# Patient Record
Sex: FEMALE | Race: BLACK OR AFRICAN AMERICAN | NOT HISPANIC OR LATINO
[De-identification: names, ages, dates, MRNs, and addresses within clinical notes are randomized per-mention and may not be internally consistent; named-entity substitution may affect disease eponyms.]

---

## 2023-12-27 ENCOUNTER — NON-APPOINTMENT (OUTPATIENT)
Age: 23
End: 2023-12-27

## 2023-12-27 PROBLEM — Z00.00 ENCOUNTER FOR PREVENTIVE HEALTH EXAMINATION: Status: ACTIVE | Noted: 2023-12-27

## 2024-01-14 NOTE — LETTER BODY
[Dear  ___] : Dear ~MAI, [I had the pleasure of evaluating your patient, [unfilled] for ___] : I had the pleasure of evaluating your patient, [unfilled] for [unfilled]. [Attached please find my note.] : Attached please find my note.

## 2024-01-16 ENCOUNTER — NON-APPOINTMENT (OUTPATIENT)
Age: 24
End: 2024-01-16

## 2024-01-16 ENCOUNTER — APPOINTMENT (OUTPATIENT)
Dept: GYNECOLOGIC ONCOLOGY | Facility: CLINIC | Age: 24
End: 2024-01-16
Payer: COMMERCIAL

## 2024-01-16 ENCOUNTER — TRANSCRIPTION ENCOUNTER (OUTPATIENT)
Age: 24
End: 2024-01-16

## 2024-01-16 VITALS
WEIGHT: 175 LBS | BODY MASS INDEX: 32.2 KG/M2 | HEIGHT: 62 IN | HEART RATE: 88 BPM | SYSTOLIC BLOOD PRESSURE: 116 MMHG | DIASTOLIC BLOOD PRESSURE: 75 MMHG

## 2024-01-16 DIAGNOSIS — N93.9 ABNORMAL UTERINE AND VAGINAL BLEEDING, UNSPECIFIED: ICD-10-CM

## 2024-01-16 DIAGNOSIS — Z78.9 OTHER SPECIFIED HEALTH STATUS: ICD-10-CM

## 2024-01-16 PROCEDURE — 99205 OFFICE O/P NEW HI 60 MIN: CPT | Mod: 25

## 2024-01-16 PROCEDURE — 57454 BX/CURETT OF CERVIX W/SCOPE: CPT

## 2024-01-16 RX ORDER — NORETHINDRONE ACETATE AND ETHINYL ESTRADIOL, ETHINYL ESTRADIOL AND FERROUS FUMARATE 1MG-10(24)
KIT ORAL
Refills: 0 | Status: ACTIVE | COMMUNITY

## 2024-01-16 NOTE — DISCUSSION/SUMMARY
[Reviewed Clinical Lab Test(s)] : Results of clinical tests were reviewed. [Discuss Tests w/Referring Providers] : Results of labs/radiology studies and the treatment recommendations were discussed with performing/referring physician. [Discuss Alternatives/Risks/Benefits w/Patient] : All alternatives, risks, and benefits were discussed with the patient/family and all questions were answered.  Patient expressed good understanding and appreciates the importance of follow up as recommended. [Visit Time ___ Minutes] : [unfilled] minutes [Face to Face Time___ Minutes] : with [unfilled] minutes in face to face consultation. [FreeTextEntry1] : - Patients history and work up to date reviewed in detail. - Plan to have her pathology re-reviewed her at [facility] to assure the diagnosis.  - Discussed HPV 16 or 18 positivity is the highest risk clinical scenario and is an indication for immediate referral to colposcopy which was already performed by her referring provider revealing CIN2. Patient counseled that most infections detected over the years of screening are reactivations of latent infections that are acquired at or near sexual debut. Reactivation of a latent infection could imply waning immunity, and the patient might be at increased risk of persistence. Discussed this is particularly common in immunocompromised individuals albeit she doesn't currently have any risk factors for immunocompromise. -Also discussed persistent HPV infection (defined as consecutively positive HPV results at least 12 months apart) is a necessary pathogenetic step for progression to clinically relevant disease. Most, if not all, patients with persistent HPV infection will be diagnosed with cervical intraepithelial neoplasia 2 or more (JESSIKA 2+) within five to seven years, many in as few as two years. I also informed her that given her age I suspect that this HPV infection is likely due to re-infection as opposed to persistent infection. -Counseling provided that High-grade JESSIKA is also more likely to regress in younger patients than in older patients and is less likely to progress to cancer. In a meta-analysis, patients younger than 30 years with JESSIKA 2 had a regression rate of 60 percent at 24 months, which was slightly higher than the 50 percent regression rate in the overall study population. In one study, patients ages 20 to 24 years with JESSIKA 3 were estimated to have a 0.5 percent progression rate to cancer in one year, which is substantially lower than the 10 percent per year risk for patients =80 years old. -Discussed that management of JESSIKA 2,3 in nonpregnant patients <25 years is as follows: JESSIKA 2 is specified then observation is preferred but treatment is recommended. -Discussed the importance of Gardasil vaccination for both her and her partner who was present for the visit and they expressed understanding. -Discussion regarding regular consistent use of BCM had today. GCCT sent to assure no infectious etiology of her post coital bleeding episode.  -When observation is performed then should initially consist of cytology and colposcopy at 6 and 12 months. -After this extensive counseling shared decision made to proceed with observation with cytology and colposcopy at 6 months with management plan determined thereafter based on these results. -Discussed rationale for baseline colposcopy today to which the patient was amenable. Colposcopy done today given this is my baseline examination and will discuss results via vteb visit in 2 weeks. Patient tolerated the procedure well and standard post procedure precautions provided. - I spent the time noted on the day of this patient encounter preparing for, providing and documenting the above service. I have counseled and educated the patient on the differential, workup, disease course, and treatment/management plan. Education was provided to the patient during this encounter. All questions and concerns were answered and addressed in detail.

## 2024-01-16 NOTE — PROCEDURE
[Cervical Biopsy] : a cervical biopsy [Patient] : the patient [Written consent] : written consent was obtained prior to the procedure and is detailed in the patient's record [Betadine] : betadine [Silver Nitrate] : silver nitrate [Yes] : the specimen was sent to pathology [No Complications] : none [FreeTextEntry1] : All the risks, benefits and alternatives to the procedure were extensively discussed with the patient and written consent was obtained. A timeout protocol was performed prior to initiating the procedure. Bimanual examination was done to determine the position of the uterus. Vaginal speculum was inserted, and the cervix was visualized and adequate colposcopy due to ability to visualize entire transformation zone. The cervix was cleansed with 5% acetic acid and allowed to bathe in acetic acid for at least 1 minute. A mild acetowhite lesion was noted at both the 12 o'clock position with no punctuations or vascularity. Biopsy from site was taken with the Kevorkian biopsy forceps. Next endocervical curettage was gently performed where cervical stenosis noted and gentle dilation with the curettage was performed. Silver nitrate was applied to the biopsy sites and excellent hemostasis noted.   Follow up: The patient tolerated the procedure well without complications.  Standard post-procedure care is explained and return precautions are given.   Specimen sent to pathology for analysis.

## 2024-01-16 NOTE — PHYSICAL EXAM
[Chaperone Present] : A chaperone was present in the examining room during all aspects of the physical examination [Normal] : Recto-Vaginal Exam: Normal [Fully active, able to carry on all pre-disease performance without restriction] : Status 0 - Fully active, able to carry on all pre-disease performance without restriction [FreeTextEntry1] : Eielen LAMAR [de-identified] : See colpo

## 2024-01-16 NOTE — HISTORY OF PRESENT ILLNESS
[FreeTextEntry1] : GYN/Ref: Laurie Vance NP PCP: None  Ms. Marquez, 23 years old, referred for JESSIKA-2 on recent colposcopy.  Patient states her most recent Pap smear came back as low-grade dysplasia, followed by a colposcopy identifying high-grade JESSIKA-2.  She was referred for further management.  Patient had her first Pap smear last year which was normal.  Denies f/c/n/v/d/abnormal vaginal bleeding.  Denies changes to bowel or bladder habits; denies unexplained weight loss or gain.  Denies abdominal pain, pressure, back pain or bloating.  Denies any other associated symptoms.   She has had one episode of bleeding with sex about 2 weeks ago.  She thinks this may have been due to a mistake in forgetting to take her BCM pills.   Final pathology: 12/7/2023 colposcopy ([facility]) 1.  ECC: Benign squamous epithelium; benign endocervical tissue. 2.  Cervix 12:00 biopsy: JESSIKA-2 in the background of JESSIKA-1.  Pap: 10/24/2023: LSIL  POB: G0.  Pt does wish to have children in the future PGYN:  Menarche age 14;  LMP 11/16/23 PMH: No significant past medical history Meds: Low Loestrin Allergies: NKDA PSH: No past surgical history Social Hx: Non-cigarette smoker, occasional alcohol, prior marijuana use. FH: No family history of cancer  Health Maintenance: Mammogram: Never.  Patient aware breast cancer screening mammogram begins at age 40. Colonoscopy: Never.  Patient aware colon cancer screening begins at age 45. PAP: As above

## 2024-01-17 LAB
C TRACH RRNA SPEC QL NAA+PROBE: NOT DETECTED
N GONORRHOEA RRNA SPEC QL NAA+PROBE: NOT DETECTED
SOURCE AMPLIFICATION: NORMAL

## 2024-01-24 LAB — CORE LAB BIOPSY: NORMAL

## 2024-02-02 ENCOUNTER — APPOINTMENT (OUTPATIENT)
Dept: GYNECOLOGIC ONCOLOGY | Facility: CLINIC | Age: 24
End: 2024-02-02
Payer: COMMERCIAL

## 2024-02-02 PROCEDURE — 99212 OFFICE O/P EST SF 10 MIN: CPT

## 2024-02-02 NOTE — HISTORY OF PRESENT ILLNESS
[FreeTextEntry1] : **Please note that this visit was converted to telemed due to technical difficulties** Permission was granted from the patient for this visit.   GYN/Ref: Laurie Vance NP PCP: None  Ms. Marquez, 23 years old, referred for JESSIKA-2 on recent colposcopy.  Patient states her most recent Pap smear came back as low-grade dysplasia, followed by a colposcopy identifying high-grade JESSIKA-2.  She was referred for further management.  Patient had her first Pap smear last year which was normal.  Denies f/c/n/v/d/abnormal vaginal bleeding.  Denies changes to bowel or bladder habits; denies unexplained weight loss or gain.  Denies abdominal pain, pressure, back pain or bloating.  Denies any other associated symptoms.   At her last visit she noted that she has had one episode of bleeding with sex about 2 weeks ago.  She thinks this may have been due to a mistake in forgetting to take her BCM pills.   She is now concerned today because her LMP was 11/14/23 and she has not had a menses since.  She has had multiple negative pregnancy tests. She also notes a scant amount of brownish discharge after a recent intercourse but no bleeding. We reviewed her recent colposcopy results and biopsy results which were benign. Reviewed recent negative GCCT. We discussed plan for close interval follow up in 6 months for plan of observation with cytology and colposcopy at 6 months. We discussed plan for TVUS and possible EMB in the next month for workup of her AUB.  Final pathology: 1/16/2024:  Colposcopy ([facility]) 1.  Cervix, 12:00, biopsy:      -   Benign squamocolumnar junction mucosa with acute inflammation and reactive changes 2.  Endocervix, curettings:      -   Benign endocervical epithelium  12/7/2023 colposcopy ([facility]) 1.  ECC: Benign squamous epithelium; benign endocervical tissue. 2.  Cervix 12:00 biopsy: JESSIKA-2 in the background of JESSIKA-1.  Pap: 10/24/2023: LSIL  POB: G0.  Pt does wish to have children in the future PGYN:  Menarche age 14;  LMP 11/16/23 PMH: No significant past medical history Meds: Low Loestrin Allergies: NKDA PSH: No past surgical history Social Hx: Non-cigarette smoker, occasional alcohol, prior marijuana use. FH: No family history of cancer  Health Maintenance: Mammogram: Never.  Patient aware breast cancer screening mammogram begins at age 40. Colonoscopy: Never.  Patient aware colon cancer screening begins at age 45. PAP: As above

## 2024-02-02 NOTE — DISCUSSION/SUMMARY
[Reviewed Clinical Lab Test(s)] : Results of clinical tests were reviewed. [Discuss Tests w/Referring Providers] : Results of labs/radiology studies and the treatment recommendations were discussed with performing/referring physician. [Discuss Alternatives/Risks/Benefits w/Patient] : All alternatives, risks, and benefits were discussed with the patient/family and all questions were answered.  Patient expressed good understanding and appreciates the importance of follow up as recommended. [Visit Time ___ Minutes] : [unfilled] minutes [Face to Face Time___ Minutes] : with [unfilled] minutes in face to face consultation. [FreeTextEntry1] : - Patients history and work up to date reviewed in detail. - Plan to have her pathology re-reviewed her at [facility] to assure the diagnosis, this is still pending.  - Discussed HPV 16 or 18 positivity is the highest risk clinical scenario and is an indication for immediate referral to colposcopy which was already performed by her referring provider revealing CIN2. Patient counseled that most infections detected over the years of screening are reactivations of latent infections that are acquired at or near sexual debut. Reactivation of a latent infection could imply waning immunity, and the patient might be at increased risk of persistence. Discussed this is particularly common in immunocompromised individuals albeit she doesn't currently have any risk factors for immunocompromise. -Also discussed persistent HPV infection (defined as consecutively positive HPV results at least 12 months apart) is a necessary pathogenetic step for progression to clinically relevant disease. Most, if not all, patients with persistent HPV infection will be diagnosed with cervical intraepithelial neoplasia 2 or more (JESSIKA 2+) within five to seven years, many in as few as two years. I also informed her that given her age I suspect that this HPV infection is likely due to re-infection as opposed to persistent infection. -Counseling provided that High-grade JESSIKA is also more likely to regress in younger patients than in older patients and is less likely to progress to cancer. In a meta-analysis, patients younger than 30 years with JESSIKA 2 had a regression rate of 60 percent at 24 months, which was slightly higher than the 50 percent regression rate in the overall study population. In one study, patients ages 20 to 24 years with JESSIKA 3 were estimated to have a 0.5 percent progression rate to cancer in one year, which is substantially lower than the 10 percent per year risk for patients =80 years old. -Discussed that management of JESSIKA 2,3 in nonpregnant patients <25 years is as follows: JESSIKA 2 is specified then observation is preferred but treatment is recommended. -Discussed the importance of Gardasil vaccination again. -She is now concerned today because her LMP was 11/14/23 and she has not had a menses since.  She has had multiple negative pregnancy tests. She also notes a scant amount of brownish discharge after a recent intercourse but no bleeding. We reviewed her recent colposcopy results and biopsy results which were benign. Reviewed recent negative GCCT. We discussed plan for close interval follow up in 6 months for plan of observation with cytology and colposcopy at 6 months. We discussed plan for TVUS and possible EMB in the next month for workup of her AUB. -When observation is performed then should initially consist of cytology and colposcopy at 6 and 12 months. -After this extensive counseling shared decision made to proceed with observation with cytology and colposcopy at 6 months with management plan determined thereafter based on these results. -For close interval follow up to review her TVUS and discuss if EMB indicated. She will call to schedule follow up appt once sono scheduled. - I spent the time noted on the day of this patient encounter preparing for, providing and documenting the above service. I have counseled and educated the patient on the differential, workup, disease course, and treatment/management plan. Education was provided to the patient during this encounter. All questions and concerns were answered and addressed in detail.

## 2024-02-28 ENCOUNTER — APPOINTMENT (OUTPATIENT)
Dept: ULTRASOUND IMAGING | Facility: CLINIC | Age: 24
End: 2024-02-28
Payer: COMMERCIAL

## 2024-02-28 ENCOUNTER — OUTPATIENT (OUTPATIENT)
Dept: OUTPATIENT SERVICES | Facility: HOSPITAL | Age: 24
LOS: 1 days | End: 2024-02-28
Payer: COMMERCIAL

## 2024-02-28 DIAGNOSIS — Z00.8 ENCOUNTER FOR OTHER GENERAL EXAMINATION: ICD-10-CM

## 2024-02-28 DIAGNOSIS — N93.9 ABNORMAL UTERINE AND VAGINAL BLEEDING, UNSPECIFIED: ICD-10-CM

## 2024-02-28 PROCEDURE — 76856 US EXAM PELVIC COMPLETE: CPT

## 2024-02-28 PROCEDURE — 76856 US EXAM PELVIC COMPLETE: CPT | Mod: 26

## 2024-02-28 PROCEDURE — 76830 TRANSVAGINAL US NON-OB: CPT

## 2024-02-28 PROCEDURE — 76830 TRANSVAGINAL US NON-OB: CPT | Mod: 26

## 2024-03-18 PROBLEM — N93.9 ABNORMAL UTERINE BLEEDING: Status: ACTIVE | Noted: 2024-02-02

## 2024-03-18 PROBLEM — N87.1 CIN II (CERVICAL INTRAEPITHELIAL NEOPLASIA II): Status: ACTIVE | Noted: 2024-01-14

## 2024-03-18 NOTE — OB HISTORY
[Total Preg ___] : : [unfilled] [Menarche Age ____] : age at menarche was [unfilled] [Definite ___ (Date)] : the last menstrual period was [unfilled]

## 2024-03-19 ENCOUNTER — APPOINTMENT (OUTPATIENT)
Dept: GYNECOLOGIC ONCOLOGY | Facility: CLINIC | Age: 24
End: 2024-03-19
Payer: COMMERCIAL

## 2024-03-19 DIAGNOSIS — N87.1 MODERATE CERVICAL DYSPLASIA: ICD-10-CM

## 2024-03-19 DIAGNOSIS — N93.9 ABNORMAL UTERINE AND VAGINAL BLEEDING, UNSPECIFIED: ICD-10-CM

## 2024-03-19 PROCEDURE — 99212 OFFICE O/P EST SF 10 MIN: CPT

## 2024-03-23 NOTE — HISTORY OF PRESENT ILLNESS
[Home] : at home, [unfilled] , at the time of the visit. [Medical Office: ([facility])___] : at the medical office located in  [Verbal consent obtained from patient] : the patient, [unfilled] [FreeTextEntry1] : **Please note that this visit was converted to telemed due to technical difficulties** Permission was granted for this visit.  GYN/Ref: Laurie Vance NP PCP: None  Ms. Marquez, 23 years old, referred for JESSIKA-2 on recent colposcopy.  Patient states her most recent Pap smear came back as low-grade dysplasia, followed by a colposcopy identifying high-grade JESSIKA-2.  She was referred for further management.  Patient had her first Pap smear last year which was normal.  Denies f/c/n/v/d/abnormal vaginal bleeding.  Denies changes to bowel or bladder habits; denies unexplained weight loss or gain.  Denies abdominal pain, pressure, back pain or bloating.  Denies any other associated symptoms.   She has had one episode of bleeding with sex about 2 weeks ago.  She thinks this may have been due to a mistake in forgetting to take her BCM pills. Today we present to review her workup to date.  This includes her recent colposcopic evaluation and cervical biopsies which revealed no evidence of cervical dysplasia.  Additionally her testing for gonorrhea and chlamydia was negative.  We also reviewed her recent transvaginal ultrasound which was overall normal.  The patient notes she is overall doing too well and has no complaints.  We discussed the recommendation for follow-up with her next visit being in 6 months  Imagin2024 TVUS ([facility]) Uterus: 7.5 cm x 3.6 cm x 3.8 cm. Within normal limits. Endometrium: 2 mm. Within normal limits. Right ovary: 2.8 cm x 2.5 cm x 2.0 cm. Within normal limits. Normal color Doppler flow is demonstrated for the right ovary. Left ovary: 3.1 cm x 2.1 cm x 2.9 cm. Within normal limits. Normal color Doppler flow is demonstrated for the left ovary. Fluid: None. IMPRESSION: Normal pelvic sonogram.  Final pathology: 2024 colposcopy ([facility]) 1.  Cervix, 12:00, biopsy:      -   Benign squamocolumnar junction mucosa with acute inflammation and reactive changes 2.  Endocervix, curettings:      -   Benign endocervical epithelium  2023 colposcopy ([facility]) 1.  ECC: Benign squamous epithelium; benign endocervical tissue. 2.  Cervix 12:00 biopsy: JESSIKA-2 in the background of JESSIKA-1.  Pap: 10/24/2023: LSIL  POB: G0.  Pt does wish to have children in the future PGYN:  Menarche age 14;  LMP 25 PMH: No significant past medical history Meds: Low Loestrin Allergies: NKDA PSH: No past surgical history Social Hx: Non-cigarette smoker, occasional alcohol, prior marijuana use. FH: No family history of cancer  Health Maintenance: Mammogram: Never.  Patient aware breast cancer screening mammogram begins at age 40. Colonoscopy: Never.  Patient aware colon cancer screening begins at age 45. PAP: As above

## 2024-03-23 NOTE — DISCUSSION/SUMMARY
[Reviewed Clinical Lab Test(s)] : Results of clinical tests were reviewed. [Discuss Tests w/Referring Providers] : Results of labs/radiology studies and the treatment recommendations were discussed with performing/referring physician. [Discuss Alternatives/Risks/Benefits w/Patient] : All alternatives, risks, and benefits were discussed with the patient/family and all questions were answered.  Patient expressed good understanding and appreciates the importance of follow up as recommended. [Visit Time ___ Minutes] : [unfilled] minutes [Face to Face Time___ Minutes] : with [unfilled] minutes in face to face consultation. [Reviewed Radiology Report(s)] : Radiology reports were reviewed. [FreeTextEntry1] : - Patients history and work up to date reviewed in detail. - Today we present to review her workup to date.  This includes her recent colposcopic evaluation and cervical biopsies which revealed no evidence of cervical dysplasia.  Additionally her testing for gonorrhea and chlamydia was negative.  We also reviewed her recent transvaginal ultrasound which was overall normal.  The patient notes she is overall doing too well and has no complaints.  We discussed the recommendation for follow-up with her next visit being in 6 months -After this extensive counseling shared decision made to proceed with observation with cytology and colposcopy at 6 months with management plan determined thereafter based on these results. -Discussed rationale for baseline colposcopy today to which the patient was amenable. Colposcopy done today given this is my baseline examination and will discuss results via vteb visit in 2 weeks. Patient tolerated the procedure well and standard post procedure precautions provided. - I spent the time noted on the day of this patient encounter preparing for, providing and documenting the above service. I have counseled and educated the patient on the differential, workup, disease course, and treatment/management plan. Education was provided to the patient during this encounter. All questions and concerns were answered and addressed in detail.

## 2024-03-23 NOTE — LETTER BODY
[Dear  ___] : Dear ~MAI, [I had the pleasure of evaluating your patient, [unfilled] for ___] : I had the pleasure of evaluating your patient, [unfilled] for [unfilled]. [Attached please find my note.] : Attached please find my note. [Dear  ___] : Dear  [unfilled],

## 2024-07-16 ENCOUNTER — APPOINTMENT (OUTPATIENT)
Dept: GYNECOLOGIC ONCOLOGY | Facility: CLINIC | Age: 24
End: 2024-07-16
Payer: COMMERCIAL

## 2024-07-16 ENCOUNTER — LABORATORY RESULT (OUTPATIENT)
Age: 24
End: 2024-07-16

## 2024-07-16 VITALS
RESPIRATION RATE: 16 BRPM | DIASTOLIC BLOOD PRESSURE: 72 MMHG | OXYGEN SATURATION: 99 % | BODY MASS INDEX: 34.41 KG/M2 | SYSTOLIC BLOOD PRESSURE: 115 MMHG | HEIGHT: 62 IN | TEMPERATURE: 97.7 F | HEART RATE: 82 BPM | WEIGHT: 187 LBS

## 2024-07-16 DIAGNOSIS — R30.0 DYSURIA: ICD-10-CM

## 2024-07-16 PROCEDURE — 57454 BX/CURETT OF CERVIX W/SCOPE: CPT

## 2024-07-16 PROCEDURE — 99214 OFFICE O/P EST MOD 30 MIN: CPT | Mod: 25

## 2024-07-16 PROCEDURE — 99459 PELVIC EXAMINATION: CPT

## 2024-07-18 LAB
APPEARANCE: ABNORMAL
BILIRUBIN URINE: NEGATIVE
BLOOD URINE: ABNORMAL
COLOR: NORMAL
GLUCOSE QUALITATIVE U: NEGATIVE MG/DL
KETONES URINE: NEGATIVE MG/DL
LEUKOCYTE ESTERASE URINE: ABNORMAL
NITRITE URINE: NEGATIVE
PH URINE: 6
PROTEIN URINE: 300 MG/DL
SPECIFIC GRAVITY URINE: 1.03
UROBILINOGEN URINE: 1 MG/DL

## 2024-07-19 DIAGNOSIS — N39.0 URINARY TRACT INFECTION, SITE NOT SPECIFIED: ICD-10-CM

## 2024-07-19 RX ORDER — SULFAMETHOXAZOLE AND TRIMETHOPRIM 800; 160 MG/1; MG/1
800-160 TABLET ORAL TWICE DAILY
Qty: 14 | Refills: 0 | Status: ACTIVE | COMMUNITY
Start: 2024-07-19 | End: 1900-01-01

## 2024-07-22 LAB — BACTERIA UR CULT: ABNORMAL

## 2024-07-26 LAB — CORE LAB BIOPSY: NORMAL

## 2024-08-02 ENCOUNTER — APPOINTMENT (OUTPATIENT)
Dept: GYNECOLOGIC ONCOLOGY | Facility: CLINIC | Age: 24
End: 2024-08-02
Payer: COMMERCIAL

## 2024-08-02 PROCEDURE — 99212 OFFICE O/P EST SF 10 MIN: CPT

## 2024-08-02 NOTE — DISCUSSION/SUMMARY
[Reviewed Clinical Lab Test(s)] : Results of clinical tests were reviewed. [Reviewed Radiology Report(s)] : Radiology reports were reviewed. [Discuss Tests w/Referring Providers] : Results of labs/radiology studies and the treatment recommendations were discussed with performing/referring physician. [Discuss Alternatives/Risks/Benefits w/Patient] : All alternatives, risks, and benefits were discussed with the patient/family and all questions were answered.  Patient expressed good understanding and appreciates the importance of follow up as recommended. [Visit Time ___ Minutes] : [unfilled] minutes [FreeTextEntry1] : - Patients history and work up to date reviewed in detail. - At her last visit and again today extensive counseling and shared decision made to proceed with observation with cytology and colposcopy as planned today with management plan determined thereafter based on these results. Patient tolerated the procedure well and standard post procedure precautions provided. -Today she presents to review her results which demonstrated benign squamous mucosa with concern for chronic cervicitis.  Additionally she was also diagnosed with urinary tract infection for which antibiotics were sent to her pharmacy and she completed treatment. She notes she feels 100% better. We discussed rationale for follow up in 6 months and she expressed verbal understanding. - Patient will reach out to my office for follow up in 6 months. - I spent the time noted on the day of this patient encounter preparing for, providing and documenting the above service. I have counseled and educated the patient on the differential, workup, disease course, and treatment/management plan. Education was provided to the patient during this encounter. All questions er and concerns were answered and addressed in detail.

## 2024-08-02 NOTE — LETTER BODY
[Dear  ___] : Dear ~MAI, [Dear  ___] : Dear  [unfilled], [I had the pleasure of evaluating your patient, [unfilled] for ___] : I had the pleasure of evaluating your patient, [unfilled] for [unfilled]. [Attached please find my note.] : Attached please find my note.

## 2024-08-02 NOTE — HISTORY OF PRESENT ILLNESS
[FreeTextEntry1] : **Please note that this visit was converted to telemed due to technical difficulties** Permission was granted for this visit.  GYN/Ref: Laurie Vance NP PCP: None  Ms. Marquez, 23 years old, referred for JESSIKA-2 on recent colposcopy.  Patient states her most recent Pap smear came back as low-grade dysplasia, followed by a colposcopy identifying high-grade JESSIKA-2.  She was referred for further management.  Patient had her first Pap smear last year which was normal.  Denies f/c/n/v/d/abnormal vaginal bleeding.  She does note some dysuria and urinary frequency recently. Denies any vaginal itching/burning. We reviewed the rationale for colposcopic evaluation and biopsys as indicated to which she is in agreement. She is interested in pregnancy.  At her last visit she underwent colposcopy with biopsys as indicated. Today she presents to review her results which demonstrated benign squamous mucosa with concern for chronic cervicitis.  Additionally she was also diagnosed with urinary tract infection for which antibiotics were sent to her pharmacy and she completed treatment. She notes she feels 100% better. We discussed rationale for follow up in 6 months and she expressed verbal understanding.  Imagin2024 TVUS ([facility]) Uterus: 7.5 cm x 3.6 cm x 3.8 cm. Within normal limits. Endometrium: 2 mm. Within normal limits. Right ovary: 2.8 cm x 2.5 cm x 2.0 cm. Within normal limits. Normal color Doppler flow is demonstrated for the right ovary. Left ovary: 3.1 cm x 2.1 cm x 2.9 cm. Within normal limits. Normal color Doppler flow is demonstrated for the left ovary. Fluid: None. IMPRESSION: Normal pelvic sonogram.  Final pathology: 24 ([facility]): Final Diagnosis  1. Cervix, 12 oclock, biopsy:    - Benign squamous mucosa  2. Cervix, 6 oclock, biopsy:   - Cervical mucosa from the transformation zone with chronic cervicitis  3. Endocervix, curettage:   - Scant benign endocervical tissue  2024. colposcopy ([facility]) 1.  Cervix, 12:00, biopsy:      -   Benign squamocolumnar junction mucosa with acute inflammation and reactive changes 2.  Endocervix, curettings:      -   Benign endocervical epithelium  2023 colposcopy ([facility]) 1.  ECC: Benign squamous epithelium; benign endocervical tissue. 2.  Cervix 12:00 biopsy: JESSIKA-2 in the background of JESSIKA-1.  Pap: 10/24/2023: LSIL  POB: G0.  Pt does wish to have children in the future PGYN:  Menarche age 14;  LMP 25 PMH: No significant past medical history Meds: Low Loestrin Allergies: NKDA PSH: No past surgical history Social Hx: Non-cigarette smoker, occasional alcohol, prior marijuana use. FH: No family history of cancer  Health Maintenance: Mammogram: Never.  Patient aware breast cancer screening mammogram begins at age 40. Colonoscopy: Never.  Patient aware colon cancer screening begins at age 45. PAP: As above